# Patient Record
Sex: FEMALE | HISPANIC OR LATINO
[De-identification: names, ages, dates, MRNs, and addresses within clinical notes are randomized per-mention and may not be internally consistent; named-entity substitution may affect disease eponyms.]

---

## 2023-01-17 PROBLEM — Z00.00 ENCOUNTER FOR PREVENTIVE HEALTH EXAMINATION: Status: ACTIVE | Noted: 2023-01-17

## 2023-02-09 ENCOUNTER — APPOINTMENT (OUTPATIENT)
Dept: NEUROSURGERY | Facility: CLINIC | Age: 48
End: 2023-02-09
Payer: COMMERCIAL

## 2023-02-09 ENCOUNTER — NON-APPOINTMENT (OUTPATIENT)
Age: 48
End: 2023-02-09

## 2023-02-09 VITALS
DIASTOLIC BLOOD PRESSURE: 57 MMHG | TEMPERATURE: 98.1 F | OXYGEN SATURATION: 97 % | HEART RATE: 76 BPM | BODY MASS INDEX: 36.94 KG/M2 | HEIGHT: 64 IN | WEIGHT: 216.38 LBS | SYSTOLIC BLOOD PRESSURE: 105 MMHG

## 2023-02-09 DIAGNOSIS — Z78.9 OTHER SPECIFIED HEALTH STATUS: ICD-10-CM

## 2023-02-09 PROCEDURE — 99203 OFFICE O/P NEW LOW 30 MIN: CPT

## 2023-02-09 RX ORDER — CLOBETASOL PROPIONATE 0.5 MG/G
0.05 CREAM TOPICAL
Refills: 0 | Status: ACTIVE | COMMUNITY

## 2023-02-09 RX ORDER — LEVONORGESTREL 52 MG/1
INTRAUTERINE DEVICE INTRAUTERINE
Refills: 0 | Status: ACTIVE | COMMUNITY

## 2023-02-09 NOTE — PHYSICAL EXAM
[General Appearance - Alert] : alert [General Appearance - In No Acute Distress] : in no acute distress [General Appearance - Well-Appearing] : healthy appearing [Oriented To Time, Place, And Person] : oriented to person, place, and time [Person] : oriented to person [Place] : oriented to place [Time] : oriented to time [Motor Tone] : muscle tone was normal in all four extremities [Intact] : all motor groups within normal limits of strength and tone bilaterally [Sclera] : the sclera and conjunctiva were normal [Outer Ear] : the ears and nose were normal in appearance [Neck Appearance] : the appearance of the neck was normal [] : no respiratory distress [Abnormal Walk] : normal gait [Skin Color & Pigmentation] : normal skin color and pigmentation

## 2023-02-13 NOTE — HISTORY OF PRESENT ILLNESS
[de-identified] : Ms. COMBS is a pleasant 47 year old female who presents today with a chief complaint of idiopathic intracranial hypertension.  Many years ago she was told that she had papilledema on routine eye exam but she states that she never acted on the referral to ophthalmology.  In 4/2022, she was again found to have papilledema and was sent to the ophthalmologist who confirmed the finding.  She was sent to neurology who started her on Diamox and was sent for an MRV which revealed stenosis and she was sent to Dr. Ayala for further evaluation.  He performed a lumbar puncture with an opening pressure of 27cm H2O and a venogram which revealed severe right transverse sinus stenosis with a pressure gradient of 10cm H2O. \par \par Currently:\par \par Meds - Diamox 500mg Daily; Which she is tolerating well\par Pulsatile Tinnitus - RIGHT ear, for > 10 year; never tried right neck pressure\par Headaches - None\par Vision - No blurry vision, diplopia or TVOs; Last visit with Dr. Alvarez papilledema was persistent (10/2022)

## 2023-02-13 NOTE — ASSESSMENT
[FreeTextEntry1] : Impression:\par IIH with Papilledema\par Tolerating Diamox 500mg Daily\par RIGHT pulsatile tinnitus, not very bothersome\par No Headaches\par \par Ms. JEANNE COMBS suffers from IIH and she has the classic appearance of lateral venous sinus stenosis on imaging.  She is a good candidate for venous sinus stenting as an appropriate minimally invasive surgical treatment. I discussed with the patient my extensive personal experience with venous sinus stenting for IIH and we also discussed the recent literature that supports venous stenting as an alternative less invasive and cheaper option to CSF shunt. Stenting also has better outcomes in carefully selected patients. \par \par While stenting is a good option, I do not think it is urgent at this time.  Since her symptoms are under control, I would like her to see neuro-ophthalmology for an eye exam.  If papilledema is still present, I recommend a trial of increased Diamox.  If papilledema persists despite increasing Diamox or if she is unable to tolerate the increase, we can consider stenting. \par \par Plan:\par Evaluation by Neuro-ophthalmology '\par Follow Up with Me After Above

## 2023-02-16 ENCOUNTER — NON-APPOINTMENT (OUTPATIENT)
Age: 48
End: 2023-02-16

## 2023-02-16 ENCOUNTER — APPOINTMENT (OUTPATIENT)
Dept: OPHTHALMOLOGY | Facility: CLINIC | Age: 48
End: 2023-02-16
Payer: COMMERCIAL

## 2023-02-16 PROCEDURE — 92133 CPTRZD OPH DX IMG PST SGM ON: CPT

## 2023-02-16 PROCEDURE — 92004 COMPRE OPH EXAM NEW PT 1/>: CPT

## 2023-02-16 PROCEDURE — 92083 EXTENDED VISUAL FIELD XM: CPT

## 2023-03-07 ENCOUNTER — APPOINTMENT (OUTPATIENT)
Dept: NEUROSURGERY | Facility: CLINIC | Age: 48
End: 2023-03-07
Payer: COMMERCIAL

## 2023-03-07 PROCEDURE — 99213 OFFICE O/P EST LOW 20 MIN: CPT

## 2023-03-07 RX ORDER — ACETAZOLAMIDE 250 MG/1
250 TABLET ORAL
Refills: 0 | Status: DISCONTINUED | COMMUNITY
End: 2023-03-07

## 2023-03-07 RX ORDER — ACETAZOLAMIDE 500 MG/1
500 CAPSULE ORAL
Refills: 0 | Status: ACTIVE | COMMUNITY

## 2023-03-12 NOTE — ASSESSMENT
[FreeTextEntry1] : Impression:\par IIH with optic nerve drusen\par Tolerating Diamox 1000mg Daily\par RIGHT pulsatile tinnitus, intermittent, not very bothersome\par No Headaches\par \par Ms. JEANNE COMBS suffers from IIH and she has the classic appearance of lateral venous sinus stenosis on imaging.  She is a good candidate for venous sinus stenting as an appropriate minimally invasive surgical treatment. I discussed with the patient my extensive personal experience with venous sinus stenting for IIH and we also discussed the recent literature that supports venous stenting as an alternative less invasive and cheaper option to CSF shunt. Stenting also has better outcomes in carefully selected patients. \par \par Based on her recent eye exam by Dr. Medel without papilledema and in the absence of debilitating symptoms, surgical intervention is not urgent.  She is going to continue Diamox and follow up with Dr. Medel in 3 months.  I would like to see her after her next eye exam.\par \par Plan:\par Follow Up with Me After Next Ophthalmology Visit

## 2023-03-12 NOTE — HISTORY OF PRESENT ILLNESS
[de-identified] : Ms. COMBS is a pleasant 47 year old female who presents today for follow up of idiopathic intracranial hypertension.  Many years ago she was told that she had papilledema on routine eye exam but she states that she never acted on the referral to ophthalmology.  In 4/2022, she was again found to have papilledema and was sent to the ophthalmologist who confirmed the finding.  She was sent to neurology who started her on Diamox and was sent for an MRV which revealed stenosis and she was sent to Dr. Ayala for further evaluation.  He performed a lumbar puncture with an opening pressure of 27cm H2O and a venogram which revealed severe right transverse sinus stenosis with a pressure gradient of 10cm H2O. \par \par Currently:\par \par Meds - Diamox 1000mg Daily; Which she is tolerating well\par Pulsatile Tinnitus - RIGHT ear, intermittent for > 10 years; not very bothersome; never tried right neck pressure\par Headaches - None\par Vision - No blurry vision, diplopia or TVOs\par \par Since our last visit, she saw Dr. Medel who noted bilateral optic nerve drusen; no papilledema.  She increase her Diamox to 1000mg daily which she is tolerating well.

## 2023-03-27 ENCOUNTER — APPOINTMENT (OUTPATIENT)
Dept: OPHTHALMOLOGY | Facility: CLINIC | Age: 48
End: 2023-03-27

## 2023-04-13 ENCOUNTER — NON-APPOINTMENT (OUTPATIENT)
Age: 48
End: 2023-04-13

## 2023-05-18 ENCOUNTER — NON-APPOINTMENT (OUTPATIENT)
Age: 48
End: 2023-05-18

## 2023-05-18 ENCOUNTER — APPOINTMENT (OUTPATIENT)
Dept: OPHTHALMOLOGY | Facility: CLINIC | Age: 48
End: 2023-05-18
Payer: COMMERCIAL

## 2023-05-18 PROCEDURE — 92133 CPTRZD OPH DX IMG PST SGM ON: CPT

## 2023-05-18 PROCEDURE — 92014 COMPRE OPH EXAM EST PT 1/>: CPT

## 2023-05-18 PROCEDURE — 92083 EXTENDED VISUAL FIELD XM: CPT

## 2023-05-30 ENCOUNTER — APPOINTMENT (OUTPATIENT)
Dept: NEUROSURGERY | Facility: CLINIC | Age: 48
End: 2023-05-30
Payer: COMMERCIAL

## 2023-05-30 VITALS — HEIGHT: 64 IN | BODY MASS INDEX: 35.68 KG/M2 | WEIGHT: 209 LBS

## 2023-05-30 DIAGNOSIS — G93.2 BENIGN INTRACRANIAL HYPERTENSION: ICD-10-CM

## 2023-05-30 DIAGNOSIS — H93.A9 PULSATILE TINNITUS, UNSPECIFIED EAR: ICD-10-CM

## 2023-05-30 PROCEDURE — 99213 OFFICE O/P EST LOW 20 MIN: CPT

## 2023-06-01 ENCOUNTER — APPOINTMENT (OUTPATIENT)
Dept: NEUROLOGY | Facility: CLINIC | Age: 48
End: 2023-06-01

## 2023-06-04 NOTE — ASSESSMENT
[FreeTextEntry1] : Impression:\par IIH with optic nerve drusen\par Decreased Diamox to 500mg Daily\par RIGHT pulsatile tinnitus, intermittent, not very bothersome\par No Headaches\par \par Ms. JEANNE COMBS suffers from IIH and she has the classic appearance of lateral venous sinus stenosis on imaging.  She is a good candidate for venous sinus stenting as an appropriate minimally invasive surgical treatment. I discussed with the patient my extensive personal experience with venous sinus stenting for IIH and we also discussed the recent literature that supports venous stenting as an alternative less invasive and cheaper option to CSF shunt. Stenting also has better outcomes in carefully selected patients. \par \par Based on her recent eye exam by Dr. Medel without papilledema and in the absence of debilitating symptoms, surgical intervention is not indicated at this time.  She is going to continue Diamox and follow up with Dr. Medel in 3 months.  I recommend follow up with me if papilledema develops, pulsatile tinnitus worsens or she develops headaches related to her elevated intracranial pressure.\par \par Plan:\par Follow Up with Me As Needed for development of papilledema, headaches or worsening pulsatile tinnitus

## 2023-06-04 NOTE — HISTORY OF PRESENT ILLNESS
[de-identified] : Ms. COMBS is a pleasant 48 year old female who presents today for follow up of idiopathic intracranial hypertension.  Many years ago she was told that she had papilledema on routine eye exam but she states that she never acted on the referral to ophthalmology.  In 4/2022, she was again found to have papilledema and was sent to the ophthalmologist who confirmed the finding.  She was sent to neurology who started her on Diamox and was sent for an MRV which revealed stenosis and she was sent to Dr. Ayala for further evaluation.  He performed a lumbar puncture with an opening pressure of 27cm H2O and a venogram which revealed severe right transverse sinus stenosis with a pressure gradient of 10cm H2O. \par \par Currently:\par \par Meds - Diamox 500mg Daily; Which she is tolerating well\par Pulsatile Tinnitus - RIGHT ear, intermittent for > 10 years; not very bothersome; never tried right neck pressure\par Headaches - None\par Vision - No blurry vision, diplopia or TVOs\par \par Since our last visit, she saw Dr. Medel who noted bilateral optic nerve drusen; no papilledema.  She decreased her Diamox back down to 500mg daily which she is tolerating well.

## 2023-08-10 ENCOUNTER — NON-APPOINTMENT (OUTPATIENT)
Age: 48
End: 2023-08-10

## 2023-08-10 ENCOUNTER — APPOINTMENT (OUTPATIENT)
Dept: OPHTHALMOLOGY | Facility: CLINIC | Age: 48
End: 2023-08-10
Payer: MEDICAID

## 2023-08-10 PROCEDURE — 92133 CPTRZD OPH DX IMG PST SGM ON: CPT

## 2023-08-10 PROCEDURE — 92083 EXTENDED VISUAL FIELD XM: CPT

## 2023-08-10 PROCEDURE — 92014 COMPRE OPH EXAM EST PT 1/>: CPT

## 2023-11-09 ENCOUNTER — APPOINTMENT (OUTPATIENT)
Dept: OPHTHALMOLOGY | Facility: CLINIC | Age: 48
End: 2023-11-09
Payer: MEDICAID

## 2023-11-09 ENCOUNTER — NON-APPOINTMENT (OUTPATIENT)
Age: 48
End: 2023-11-09

## 2023-11-09 PROCEDURE — 92014 COMPRE OPH EXAM EST PT 1/>: CPT

## 2023-11-09 PROCEDURE — 92083 EXTENDED VISUAL FIELD XM: CPT

## 2023-11-09 PROCEDURE — 92133 CPTRZD OPH DX IMG PST SGM ON: CPT

## 2024-02-08 ENCOUNTER — APPOINTMENT (OUTPATIENT)
Dept: OPHTHALMOLOGY | Facility: CLINIC | Age: 49
End: 2024-02-08
Payer: MEDICAID

## 2024-02-08 ENCOUNTER — NON-APPOINTMENT (OUTPATIENT)
Age: 49
End: 2024-02-08

## 2024-02-08 PROCEDURE — 92014 COMPRE OPH EXAM EST PT 1/>: CPT

## 2024-02-08 PROCEDURE — 92083 EXTENDED VISUAL FIELD XM: CPT

## 2024-02-08 PROCEDURE — 92133 CPTRZD OPH DX IMG PST SGM ON: CPT

## 2024-07-14 ENCOUNTER — NON-APPOINTMENT (OUTPATIENT)
Age: 49
End: 2024-07-14

## 2024-08-08 ENCOUNTER — APPOINTMENT (OUTPATIENT)
Dept: OPHTHALMOLOGY | Facility: CLINIC | Age: 49
End: 2024-08-08

## 2024-08-12 ENCOUNTER — APPOINTMENT (OUTPATIENT)
Dept: OPHTHALMOLOGY | Facility: CLINIC | Age: 49
End: 2024-08-12
Payer: MEDICAID

## 2024-08-12 ENCOUNTER — NON-APPOINTMENT (OUTPATIENT)
Age: 49
End: 2024-08-12

## 2024-08-12 PROCEDURE — 92083 EXTENDED VISUAL FIELD XM: CPT

## 2024-08-12 PROCEDURE — 92014 COMPRE OPH EXAM EST PT 1/>: CPT

## 2024-08-12 PROCEDURE — 92133 CPTRZD OPH DX IMG PST SGM ON: CPT

## 2024-08-21 ENCOUNTER — NON-APPOINTMENT (OUTPATIENT)
Age: 49
End: 2024-08-21

## 2024-08-30 ENCOUNTER — NON-APPOINTMENT (OUTPATIENT)
Age: 49
End: 2024-08-30

## 2025-01-16 ENCOUNTER — NON-APPOINTMENT (OUTPATIENT)
Age: 50
End: 2025-01-16

## 2025-08-14 ENCOUNTER — APPOINTMENT (OUTPATIENT)
Dept: OPHTHALMOLOGY | Facility: CLINIC | Age: 50
End: 2025-08-14

## 2025-08-15 ENCOUNTER — APPOINTMENT (OUTPATIENT)
Dept: OPHTHALMOLOGY | Facility: CLINIC | Age: 50
End: 2025-08-15

## 2025-08-15 ENCOUNTER — NON-APPOINTMENT (OUTPATIENT)
Age: 50
End: 2025-08-15

## 2025-08-15 PROCEDURE — 92014 COMPRE OPH EXAM EST PT 1/>: CPT

## 2025-08-15 PROCEDURE — 92083 EXTENDED VISUAL FIELD XM: CPT

## 2025-08-15 PROCEDURE — 92133 CPTRZD OPH DX IMG PST SGM ON: CPT
